# Patient Record
Sex: MALE | Race: WHITE | NOT HISPANIC OR LATINO | Employment: FULL TIME | URBAN - METROPOLITAN AREA
[De-identification: names, ages, dates, MRNs, and addresses within clinical notes are randomized per-mention and may not be internally consistent; named-entity substitution may affect disease eponyms.]

---

## 2020-07-13 ENCOUNTER — OFFICE VISIT (OUTPATIENT)
Dept: NEUROSURGERY | Facility: CLINIC | Age: 60
End: 2020-07-13

## 2020-07-13 VITALS
SYSTOLIC BLOOD PRESSURE: 128 MMHG | TEMPERATURE: 98.9 F | HEIGHT: 67 IN | BODY MASS INDEX: 25.51 KG/M2 | WEIGHT: 162.5 LBS | DIASTOLIC BLOOD PRESSURE: 76 MMHG

## 2020-07-13 DIAGNOSIS — M43.17 SPONDYLOLISTHESIS OF LUMBOSACRAL REGION: Primary | ICD-10-CM

## 2020-07-13 DIAGNOSIS — M51.36 DEGENERATIVE DISC DISEASE, LUMBAR: ICD-10-CM

## 2020-07-13 PROCEDURE — 99203 OFFICE O/P NEW LOW 30 MIN: CPT | Performed by: NEUROLOGICAL SURGERY

## 2020-07-13 RX ORDER — GABAPENTIN 300 MG/1
300 CAPSULE ORAL 2 TIMES DAILY
COMMUNITY
Start: 2020-06-22

## 2020-07-13 RX ORDER — NABUMETONE 750 MG/1
750 TABLET, FILM COATED ORAL 2 TIMES DAILY
Qty: 60 TABLET | Refills: 0 | Status: SHIPPED | OUTPATIENT
Start: 2020-07-13 | End: 2020-12-14

## 2020-07-13 RX ORDER — TIZANIDINE 4 MG/1
TABLET ORAL
COMMUNITY
Start: 2020-07-10

## 2020-07-13 RX ORDER — MELOXICAM 15 MG/1
TABLET ORAL
COMMUNITY
Start: 2020-06-18 | End: 2020-07-13

## 2020-07-13 NOTE — PROGRESS NOTES
Bennett Mosley  1960  [id number]      Chief Complaint   Patient presents with   • Leg Pain     LLE   • Numbness     LLE   • Back Pain       HISTORY OF PRESENT ILLNESS: This is a 60-year-old male who had the onset of severe pain in his back rating to his left leg approximately 2 months ago.  Physical therapy has been of benefit but he is still symptomatic with the predominance of his pain in his lower extremity radiating from his hip into his leg.  This is associated with numbness as well.  He has an appointment to see Dr. Gu tomorrow.  Lumbar MRI was performed is referred for neurosurgical consultation.    Past Medical History:   Diagnosis Date   • Arthritis    • Diabetes mellitus (CMS/HCC)    • Diverticulitis    • Hearing loss    • Hernia, abdominal    • History of transfusion    • Osteoporosis    • Pneumonia        Past Surgical History:   Procedure Laterality Date   • BONY PELVIS SURGERY     • COLON RESECTION     • HERNIA REPAIR      x 3   • WRIST FUSION Left        Family History   Problem Relation Age of Onset   • Heart disease Mother    • Cancer Mother        Social History     Socioeconomic History   • Marital status:      Spouse name: Not on file   • Number of children: Not on file   • Years of education: Not on file   • Highest education level: Not on file   Tobacco Use   • Smoking status: Current Every Day Smoker     Types: Cigarettes   • Smokeless tobacco: Never Used   • Tobacco comment: Former smoker- quit x 15 yrs. started back 3 yrs ago   Substance and Sexual Activity   • Alcohol use: Yes     Frequency: Never   • Drug use: Yes     Types: Marijuana   • Sexual activity: Defer       Allergies not on file      Current Outpatient Medications:   •  gabapentin (NEURONTIN) 300 MG capsule, Take 300 mg by mouth 2 (Two) Times a Day., Disp: , Rfl:   •  meloxicam (MOBIC) 15 MG tablet, TK 1/2 T PO BID, Disp: , Rfl:   •  tiZANidine (ZANAFLEX) 4 MG tablet, TK 1 T PO Q 8 H PRN, Disp: , Rfl:      Review of Systems   Constitutional: Negative for activity change, appetite change, chills, diaphoresis, fatigue, fever and unexpected weight change.   HENT: Negative for congestion, dental problem, drooling, ear discharge, ear pain, facial swelling, hearing loss, mouth sores, nosebleeds, postnasal drip, rhinorrhea, sinus pressure, sneezing, sore throat, tinnitus, trouble swallowing and voice change.    Eyes: Negative for photophobia, pain, discharge, redness, itching and visual disturbance.   Respiratory: Negative for apnea, cough, choking, chest tightness, shortness of breath, wheezing and stridor.    Cardiovascular: Negative for chest pain, palpitations and leg swelling.   Gastrointestinal: Negative for abdominal distention, abdominal pain, anal bleeding, blood in stool, constipation, diarrhea, nausea, rectal pain and vomiting.   Endocrine: Negative for cold intolerance, heat intolerance, polydipsia, polyphagia and polyuria.   Genitourinary: Negative for decreased urine volume, difficulty urinating, dysuria, enuresis, flank pain, frequency, genital sores, hematuria and urgency.   Musculoskeletal: Positive for back pain, gait problem and myalgias. Negative for arthralgias, joint swelling, neck pain and neck stiffness.   Skin: Negative for color change, pallor, rash and wound.   Allergic/Immunologic: Negative for environmental allergies, food allergies and immunocompromised state.   Neurological: Negative for dizziness, tremors, seizures, syncope, facial asymmetry, speech difficulty, weakness, light-headedness, numbness and headaches.   Hematological: Negative for adenopathy. Does not bruise/bleed easily.   Psychiatric/Behavioral: Negative for agitation, behavioral problems, confusion, decreased concentration, dysphoric mood, hallucinations, self-injury, sleep disturbance and suicidal ideas. The patient is not nervous/anxious and is not hyperactive.    All other systems reviewed and are negative.      Vitals:     "07/13/20 1423   BP: 128/76   BP Location: Right arm   Patient Position: Sitting   Cuff Size: Adult   Temp: 98.9 °F (37.2 °C)   TempSrc: Infrared   Weight: 73.7 kg (162 lb 8 oz)   Height: 168.9 cm (66.5\")       Neurological Examination:  Mental status/speech: The patient is alert and oriented.  Speech is clear without aphysia or dysarthria.  No overt cognitive deficits.    Cranial nerve examination:    Olfaction: Smell is intact.  Vision: Vision is intact without visual field abnormalities.  Funduscopic examination is normal.  No pupillary irregularity.  Ocular motor examination: The extraocular muscles are intact.  There is no diplopia.  The pupil is round and reactive to both light and accommodation.  There is no nystagmus.  Facial movement/sensation: There is no facial weakness.  Sensation is intact in the first, second, and third divisions of the trigeminal nerve.  The corneal reflex is intact.  Auditory: Hearing is intact to finger rub bilaterally.  Cranial nerves IX, X, XI, XII: Phonation is normal.  No dysphagia.  Tongue is protruded in the midline without atrophy.  The gag reflex is intact.  Shoulder shrug is normal.    Musculoligamentous ligamentous examination: BMI 25.8.  162 pounds.  He has limitation range of motion of lumbar spine.  Straight leg raising is positive on the left; negative on the right.  Positive the second the left.  The deep tendon reflexes are elicitable but slightly decreased in the left lower extremity.  Gait is normal.  No Babinski Aminta or clonus.    Medical Decision Making:     Diagnostic Data Set: Lumbar MRI shows a grade 1 spondylolisthesis L5-S1 with closure of the neuroforamen on the right.  The images do not indicate this is congenital or acquired.      Assessment: Grade 1 spondylolisthesis L5-S1 with nerve root entrapment          Recommendations: He has an anatomical abnormality for which surgery may will be called for however I would explore all conservative options " first.  I have suggested he continue with his physical therapy and see Dr. Gu.  He will call me afterward.  I have given him prescription of Relafen 750 mg twice daily to replace the Mobic which she says is no longer effective.  He will return to see me on August 17.  He is unable to work until that time.  If he remains symptomatic and has shown very little improvement I would recommend posterior lumbar interbody fusion with decompression at L5-S1.  Thank you for allow me to see him        I greatly appreciate the opportunity to see and evaluate this individual.  If you have questions or concerns regarding issues that I may have overlooked please call me at any time: [phone].  Michael French M.D.  [facility]  [address] [place] [address].  [facility]  [id number]

## 2020-07-13 NOTE — PATIENT INSTRUCTIONS
Call Dr. French on a Monday or Tuesday (ask for Bronwyn or Vandana) and leave a message.     Dr. French will call you back at the end of the day as soon as he can.     [phone] office     [phone] Vandana rios after see Riccardo

## 2020-07-21 ENCOUNTER — TELEPHONE (OUTPATIENT)
Dept: NEUROSURGERY | Facility: CLINIC | Age: 60
End: 2020-07-21

## 2020-07-21 NOTE — TELEPHONE ENCOUNTER
----- Message from Bronwyn Duong sent at 7/21/2020  2:26 PM EDT -----  Patient had RAYSHAWN on 7/15/2020, this seems to be helping right now. His pain level right now is about a 1 and is mostly in his left ankle and foot. He is walking about mile a day.    Contact: [phone]

## 2020-08-17 ENCOUNTER — OFFICE VISIT (OUTPATIENT)
Dept: NEUROSURGERY | Facility: CLINIC | Age: 60
End: 2020-08-17

## 2020-08-17 VITALS — HEIGHT: 67 IN | TEMPERATURE: 98.1 F | BODY MASS INDEX: 25.65 KG/M2 | WEIGHT: 163.4 LBS

## 2020-08-17 DIAGNOSIS — M43.17 SPONDYLOLISTHESIS OF LUMBOSACRAL REGION: Primary | ICD-10-CM

## 2020-08-17 DIAGNOSIS — M51.36 DEGENERATIVE DISC DISEASE, LUMBAR: ICD-10-CM

## 2020-08-17 PROCEDURE — 99213 OFFICE O/P EST LOW 20 MIN: CPT | Performed by: NEUROLOGICAL SURGERY

## 2020-08-17 NOTE — PROGRESS NOTES
Bennett PATRICK Dimitri  1960  [id number]                        CHIEF COMPLAINT: Back pain         MEDICAL HISTORY SINCE LAST ENCOUNTER: This 62-year-old has a grade 1 spondylolisthesis L5-S1.  He has been to pain management with epidural steroid and facet block by Dr. Gu which helped considerably.  Is minimally symptomatic today.           Past Medical History:   Diagnosis Date   • Arthritis    • Diabetes mellitus (CMS/HCC)    • Diverticulitis    • Hearing loss    • Hernia, abdominal    • History of transfusion    • Osteoporosis    • Pneumonia               Past Surgical History:   Procedure Laterality Date   • BONY PELVIS SURGERY     • COLON RESECTION     • HERNIA REPAIR      x 3   • WRIST FUSION Left               Family History   Problem Relation Age of Onset   • Heart disease Mother    • Cancer Mother               Social History     Socioeconomic History   • Marital status:      Spouse name: Not on file   • Number of children: Not on file   • Years of education: Not on file   • Highest education level: Not on file   Tobacco Use   • Smoking status: Current Every Day Smoker     Types: Cigarettes   • Smokeless tobacco: Never Used   • Tobacco comment: Former smoker- quit x 15 yrs. started back 3 yrs ago   Substance and Sexual Activity   • Alcohol use: Yes     Frequency: Never   • Drug use: Yes     Types: Marijuana   • Sexual activity: Defer              Allergies   Allergen Reactions   • Lortab  [Hydrocodone-Acetaminophen] Other (See Comments)              Current Outpatient Medications:   •  gabapentin (NEURONTIN) 300 MG capsule, Take 300 mg by mouth 2 (Two) Times a Day., Disp: , Rfl:   •  nabumetone (RELAFEN) 750 MG tablet, Take 1 tablet by mouth 2 (Two) Times a Day., Disp: 60 tablet, Rfl: 0  •  tiZANidine (ZANAFLEX) 4 MG tablet, TK 1 T PO Q 8 H PRN, Disp: , Rfl:          Review of Systems            Vitals:    08/17/20 1333   Temp: 98.1 °F (36.7 °C)   TempSrc: Infrared   Weight: 74.1 kg (163 lb 6.4  "oz)   Height: 168.9 cm (66.5\")               EXAMINATION: Straight leg raising, Lasègue and flip test negative.  No evidence of weakness, sensory loss or reflex asymmetry.  Gait is normal.            MEDICAL DECISION MAKING: He has shown significant improvement thus far and I think is reasonable for him to return to work.  It is my understanding that the work is going allow him to start 2 weeks with a work hardening program.           ASSESSMENT/DISPOSITION: He will call me in 2 weeks to inform me of his progress.  As long as he is doing \"okay\" nothing be done.              I APPRECIATE THE OPPORTUNITY OF THIS REFERRAL. PLEASE CALL IF ANY       QUESTIONS [phone]          "

## 2020-08-17 NOTE — PATIENT INSTRUCTIONS
In 2 weeks - call Dr. French with an update.     Call Dr. French on a Monday or Tuesday (ask for Bronwyn or Vandana) and leave a message.     Dr. French will call you back at the end of the day as soon as he can.     [phone] office     [phone] Bronwyn    [phone] Vandana Chan

## 2020-08-17 NOTE — PROGRESS NOTES
Bennett Mosley  1960  [id number]      Chief Complaint   Patient presents with   • Follow-up     after RAYSHAWN & PT        HISTORY OF PRESENT ILLNESS:  [ ]    Past Medical History:   Diagnosis Date   • Arthritis    • Diabetes mellitus (CMS/HCC)    • Diverticulitis    • Hearing loss    • Hernia, abdominal    • History of transfusion    • Osteoporosis    • Pneumonia        Past Surgical History:   Procedure Laterality Date   • BONY PELVIS SURGERY     • COLON RESECTION     • HERNIA REPAIR      x 3   • WRIST FUSION Left        Family History   Problem Relation Age of Onset   • Heart disease Mother    • Cancer Mother        Social History     Socioeconomic History   • Marital status:      Spouse name: Not on file   • Number of children: Not on file   • Years of education: Not on file   • Highest education level: Not on file   Tobacco Use   • Smoking status: Current Every Day Smoker     Types: Cigarettes   • Smokeless tobacco: Never Used   • Tobacco comment: Former smoker- quit x 15 yrs. started back 3 yrs ago   Substance and Sexual Activity   • Alcohol use: Yes     Frequency: Never   • Drug use: Yes     Types: Marijuana   • Sexual activity: Defer       Allergies   Allergen Reactions   • Lortab  [Hydrocodone-Acetaminophen] Other (See Comments)         Current Outpatient Medications:   •  gabapentin (NEURONTIN) 300 MG capsule, Take 300 mg by mouth 2 (Two) Times a Day., Disp: , Rfl:   •  nabumetone (RELAFEN) 750 MG tablet, Take 1 tablet by mouth 2 (Two) Times a Day., Disp: 60 tablet, Rfl: 0  •  tiZANidine (ZANAFLEX) 4 MG tablet, TK 1 T PO Q 8 H PRN, Disp: , Rfl:     Review of Systems   Constitutional: Negative for activity change, appetite change, chills, diaphoresis, fatigue, fever and unexpected weight change.   HENT: Negative for congestion, dental problem, drooling, ear discharge, ear pain, facial swelling, hearing loss, mouth sores, nosebleeds, postnasal drip, rhinorrhea, sinus pressure, sneezing, sore  "throat, tinnitus, trouble swallowing and voice change.    Eyes: Negative for photophobia, pain, discharge, redness, itching and visual disturbance.   Respiratory: Negative for apnea, cough, choking, chest tightness, shortness of breath, wheezing and stridor.    Cardiovascular: Negative for chest pain, palpitations and leg swelling.   Gastrointestinal: Negative for abdominal distention, abdominal pain, anal bleeding, blood in stool, constipation, diarrhea, nausea, rectal pain and vomiting.   Endocrine: Negative for cold intolerance, heat intolerance, polydipsia, polyphagia and polyuria.   Genitourinary: Negative for decreased urine volume, difficulty urinating, dysuria, enuresis, flank pain, frequency, genital sores, hematuria and urgency.   Musculoskeletal: Positive for back pain and gait problem. Negative for arthralgias, joint swelling, myalgias, neck pain and neck stiffness.   Skin: Negative for color change, pallor, rash and wound.   Allergic/Immunologic: Negative for environmental allergies, food allergies and immunocompromised state.   Neurological: Negative for dizziness, tremors, seizures, syncope, facial asymmetry, speech difficulty, weakness, light-headedness, numbness and headaches.   Hematological: Negative for adenopathy. Does not bruise/bleed easily.   Psychiatric/Behavioral: Negative for agitation, behavioral problems, confusion, decreased concentration, dysphoric mood, hallucinations, self-injury, sleep disturbance and suicidal ideas. The patient is not nervous/anxious and is not hyperactive.    All other systems reviewed and are negative.      Vitals:    08/17/20 1333   Temp: 98.1 °F (36.7 °C)   TempSrc: Infrared   Weight: 74.1 kg (163 lb 6.4 oz)   Height: 168.9 cm (66.5\")       Neurological Examination:    Mental status/speech: The patient is alert and oriented.  Speech is clear without aphysia or dysarthria.  No overt cognitive deficits.    Cranial nerve examination:    Olfaction: Smell is " intact.  Vision: Vision is intact without visual field abnormalities.  Funduscopic examination is normal.  No pupillary irregularity.  Ocular motor examination: The extraocular muscles are intact.  There is no diplopia.  The pupil is round and reactive to both light and accommodation.  There is no nystagmus.  Facial movement/sensation: There is no facial weakness.  Sensation is intact in the first, second, and third divisions of the trigeminal nerve.  The corneal reflex is intact.  Auditory: Hearing is intact to finger rub bilaterally.  Cranial nerves IX, X, XI, XII: Phonation is normal.  No dysphagia.  Tongue is protruded in the midline without atrophy.  The gag reflex is intact.  Shoulder shrug is normal.    Musculoligamentous ligamentous examination: [ ]    Medical Decision Making:     Diagnostic Data Set:  [ ]      Assessment:  [ ]          Recommendations:  [ ]        I greatly appreciate the opportunity to see and evaluate this individual.  If you have questions or concerns regarding issues that I may have overlooked please call me at any time: [phone].  Michael French M.D.  [facility]  [address] [place] [address]    Scribed for Armaan French MD by Joel Liu CMA. 8/17/2020 13:34

## 2020-12-14 RX ORDER — NABUMETONE 750 MG/1
TABLET, FILM COATED ORAL
Qty: 60 TABLET | Refills: 0 | Status: SHIPPED | OUTPATIENT
Start: 2020-12-14

## 2020-12-14 NOTE — TELEPHONE ENCOUNTER
Provider:  Manolo  Caller:  Automated refill request  Surgery:  NA  Surgery Date:    Last visit:  08/17/20  Next visit: NA    Reason for call:         Requested Prescriptions     Pending Prescriptions Disp Refills   • nabumetone (RELAFEN) 750 MG tablet [Pharmacy Med Name: NABUMETONE 750MG TABLETS] 60 tablet 0     Sig: TAKE 1 TABLET BY MOUTH TWICE DAILY

## 2024-10-15 ENCOUNTER — OFFICE VISIT (OUTPATIENT)
Dept: CARDIOLOGY | Facility: CLINIC | Age: 64
End: 2024-10-15
Payer: COMMERCIAL

## 2024-10-15 ENCOUNTER — TELEPHONE (OUTPATIENT)
Dept: CARDIOLOGY | Facility: CLINIC | Age: 64
End: 2024-10-15

## 2024-10-15 VITALS
SYSTOLIC BLOOD PRESSURE: 166 MMHG | BODY MASS INDEX: 24.89 KG/M2 | WEIGHT: 158.6 LBS | HEART RATE: 81 BPM | OXYGEN SATURATION: 97 % | DIASTOLIC BLOOD PRESSURE: 96 MMHG | HEIGHT: 67 IN

## 2024-10-15 DIAGNOSIS — I10 PRIMARY HYPERTENSION: ICD-10-CM

## 2024-10-15 DIAGNOSIS — R06.02 SHORTNESS OF BREATH: ICD-10-CM

## 2024-10-15 DIAGNOSIS — R07.2 PRECORDIAL PAIN: Primary | ICD-10-CM

## 2024-10-15 PROCEDURE — 99204 OFFICE O/P NEW MOD 45 MIN: CPT | Performed by: PHYSICIAN ASSISTANT

## 2024-10-15 RX ORDER — CARVEDILOL 6.25 MG/1
6.25 TABLET ORAL 2 TIMES DAILY
Qty: 60 TABLET | Refills: 11 | Status: SHIPPED | OUTPATIENT
Start: 2024-10-15

## 2024-10-15 RX ORDER — DULOXETIN HYDROCHLORIDE 60 MG/1
1 CAPSULE, DELAYED RELEASE ORAL DAILY
COMMUNITY
Start: 2024-10-01

## 2024-10-15 RX ORDER — MELOXICAM 15 MG/1
0.5 TABLET ORAL EVERY 12 HOURS SCHEDULED
COMMUNITY
Start: 2024-10-01

## 2024-10-15 RX ORDER — LISINOPRIL 30 MG/1
1 TABLET ORAL DAILY
COMMUNITY
Start: 2024-10-09

## 2024-10-15 RX ORDER — CETIRIZINE HYDROCHLORIDE 10 MG/1
1 TABLET ORAL DAILY
COMMUNITY
Start: 2024-10-09

## 2024-10-15 NOTE — PROGRESS NOTES
Subjective   Bennett Mosley is a 64 y.o. male     Chief Complaint   Patient presents with    [facility]     Cardiac eval - patient reports had EKG at PCP office on 10/9/24 will try to obtain from PCP office.     Hypertension    Chest Pain    Shortness of Breath       HPI  The patient presents in clinic today to reestablish cardiovascular care.  He was seen through the clinic by his report at least 8 to 10 years ago.  He apparently was being evaluated at that time for chronic and recurrent issues.  He tells me he had a catheterization and eventual PTCA.  I do not have any of those records and we have tried to update his old file.  Nonetheless, he saw his primary care provider recently where he was noted to be rather significantly hypertensive.  He tells me that at times, his blood pressures were as high as 180/120.  When very hypertensive, he would have chest tightness.  He would also get dyspneic then.  He would note a degree of dizziness, nothing really of significance.  The patient has noted intermittent chest tightness since.  He has been placed on lisinopril and eventually increased to 30 mg.  Blood pressures remain persistently hypertensive.  He does his best for lifestyle changes, but has difficulty stopping smoking and avoiding sodium.  He does not exercise.  He realizes he should do all this.  The patient has no associated neck, arm, or jaw pain.  He has had no syncope.  He has no further complaints and would like evaluation because of the above.      Current Outpatient Medications   Medication Sig Dispense Refill    cetirizine (zyrTEC) 10 MG tablet Take 1 tablet by mouth Daily.      DULoxetine (CYMBALTA) 60 MG capsule Take 1 capsule by mouth Daily.      lisinopril (PRINIVIL,ZESTRIL) 30 MG tablet Take 1 tablet by mouth Daily.      meloxicam (MOBIC) 15 MG tablet Take 0.5 tablets by mouth Every 12 (Twelve) Hours.      carvedilol (COREG) 6.25 MG tablet Take 1 tablet by mouth 2 (Two) Times a Day. 60  tablet 11     No current facility-administered medications for this visit.       Lortab  [hydrocodone-acetaminophen]    Past Medical History:   Diagnosis Date    Arthritis     Diabetes mellitus     Diverticulitis     Hearing loss     Hernia, abdominal     History of transfusion     Osteoporosis     Pelvic fracture     Pneumonia        Social History     Socioeconomic History    Marital status:    Tobacco Use    Smoking status: Every Day     Types: Cigarettes    Smokeless tobacco: Never    Tobacco comments:     Former smoker- quit x 15 yrs. started back 3 yrs ago   Substance and Sexual Activity    Alcohol use: Yes     Alcohol/week: 5.0 standard drinks of alcohol     Types: 5 Cans of beer per week    Drug use: Yes     Types: Marijuana    Sexual activity: Defer       Family History   Problem Relation Age of Onset    Heart disease Mother     Cancer Mother     Heart disease Father     Lung cancer Father     Diabetes Maternal Grandmother     Diabetes Maternal Grandfather     Alzheimer's disease Maternal Grandfather     Diabetes Paternal Grandmother     Diabetes Paternal Grandfather        Review of Systems   Constitutional:  Positive for fatigue. Negative for chills, diaphoresis and fever.   HENT: Negative.     Eyes: Negative.  Negative for visual disturbance.   Respiratory:  Positive for apnea, cough, shortness of breath and wheezing. Negative for chest tightness.    Cardiovascular:  Positive for chest pain. Negative for palpitations and leg swelling.   Gastrointestinal: Negative.  Negative for abdominal pain and blood in stool.   Endocrine: Negative.    Genitourinary: Negative.  Negative for hematuria.   Musculoskeletal:  Positive for arthralgias. Negative for back pain, myalgias, neck pain and neck stiffness.   Skin: Negative.  Negative for rash and wound.   Allergic/Immunologic: Negative.  Negative for environmental allergies and food allergies.   Neurological:  Negative for dizziness, syncope, weakness,  "light-headedness, numbness and headaches.   Hematological: Negative.  Does not bruise/bleed easily.   Psychiatric/Behavioral:  Positive for sleep disturbance (sleep apnea).        Objective     Vitals:    10/15/24 1325 10/15/24 1335   BP: 165/97 166/96   Pulse: 88 81   SpO2: 97%    Weight: 71.9 kg (158 lb 9.6 oz)    Height: 170.2 cm (67\")         /96   Pulse 81   Ht 170.2 cm (67\")   Wt 71.9 kg (158 lb 9.6 oz)   SpO2 97%   BMI 24.84 kg/m²      Lab Results (most recent)       None            Physical Exam  Vitals and nursing note reviewed.   Constitutional:       General: He is not in acute distress.     Appearance: He is well-developed.   HENT:      Head: Normocephalic and atraumatic.   Eyes:      Conjunctiva/sclera: Conjunctivae normal.      Pupils: Pupils are equal, round, and reactive to light.   Neck:      Vascular: No JVD.      Trachea: No tracheal deviation.   Cardiovascular:      Rate and Rhythm: Normal rate and regular rhythm.      Heart sounds: Normal heart sounds.      S4 sounds present.   Pulmonary:      Effort: Pulmonary effort is normal.      Breath sounds: Normal breath sounds.   Abdominal:      General: Bowel sounds are normal. There is no distension.      Palpations: Abdomen is soft. There is no mass.      Tenderness: There is no abdominal tenderness. There is no guarding or rebound.   Musculoskeletal:         General: No tenderness or deformity. Normal range of motion.      Cervical back: Normal range of motion and neck supple.   Skin:     General: Skin is warm and dry.      Coloration: Skin is not pale.      Findings: No erythema or rash.   Neurological:      Mental Status: He is alert and oriented to person, place, and time.   Psychiatric:         Behavior: Behavior normal.         Thought Content: Thought content normal.         Judgment: Judgment normal.         Procedure   Procedures         Assessment & Plan      Diagnosis Plan   1. Precordial pain  Adult Transthoracic Echo Complete " W/ Cont if Necessary Per Protocol    Stress Test With Myocardial Perfusion One Day      2. Shortness of breath  Adult Transthoracic Echo Complete W/ Cont if Necessary Per Protocol    Stress Test With Myocardial Perfusion One Day      3. Primary hypertension          1.  The patient presents to reestablish care.  His main concern is with hypertension and chest pain.  He would like cardiac evaluation.    2.  We have requested all previous records certainly to include cath with reported PTCA.  We can update that database once available.    3.  He is on lisinopril but remains persistently hypertensive.  I would add carvedilol 6.25 mg twice a day increasing if needed for additional blood pressure control.  We have very strongly encouraged lifestyle changes and modifications.  We have discussed avoidance of nicotine, sodium, etc.    4.  Because of his symptoms of chest pain and otherwise, we will schedule for nuclear stress test for ischemia assessment.  I would also schedule for an echo to evaluate cardiac structure, valvular morphologies, systolic function, etc.    5.  As we know results of the above we will see the patient back.  He will alert us to response in addition of medication/carvedilol as above.           Bennett Mosley  reports that he has been smoking cigarettes. He has never used smokeless tobacco. I have educated him on the risk of diseases from using tobacco products such as cancer, COPD, and heart disease.     I advised him to quit and he is not willing to quit.    I spent 3  minutes counseling the patient.                     Electronically signed by:

## 2024-10-15 NOTE — TELEPHONE ENCOUNTER
Caller: DOC    Relationship: Provider    Best call back number: [phone]      What orders are you requesting (i.e. lab or imaging): WE REQUESTED LABS AND CT SCAN FROM PT'S PCP'S OFFICE. PT DID NOT STOP TO GET LABS DONE AFTER APPT TODAY. ALSO THEIR CT SCAN IS TOMORROW, SO THEY CAN SEND THAT AFTER THE SCAN TOMORROW BUT WONT HAVE THAT TODAY. PLEASE ADVISE.     In what timeframe would the patient need to come in: NA    Where will you receive your lab/imaging services: NA    Additional notes: NA